# Patient Record
Sex: MALE | Race: WHITE | NOT HISPANIC OR LATINO | URBAN - METROPOLITAN AREA
[De-identification: names, ages, dates, MRNs, and addresses within clinical notes are randomized per-mention and may not be internally consistent; named-entity substitution may affect disease eponyms.]

---

## 2021-12-16 ENCOUNTER — OFFICE (OUTPATIENT)
Dept: URBAN - METROPOLITAN AREA CLINIC 79 | Facility: CLINIC | Age: 76
End: 2021-12-16

## 2021-12-16 VITALS
DIASTOLIC BLOOD PRESSURE: 89 MMHG | TEMPERATURE: 97.3 F | SYSTOLIC BLOOD PRESSURE: 138 MMHG | WEIGHT: 196 LBS | HEART RATE: 94 BPM | HEIGHT: 71 IN

## 2021-12-16 DIAGNOSIS — Z87.11 PERSONAL HISTORY OF PEPTIC ULCER DISEASE: ICD-10-CM

## 2021-12-16 DIAGNOSIS — R19.8 OTHER SPECIFIED SYMPTOMS AND SIGNS INVOLVING THE DIGESTIVE S: ICD-10-CM

## 2021-12-16 DIAGNOSIS — R49.0 DYSPHONIA: ICD-10-CM

## 2021-12-16 DIAGNOSIS — Z86.010 PERSONAL HISTORY OF COLONIC POLYPS: ICD-10-CM

## 2021-12-16 DIAGNOSIS — J30.2 OTHER SEASONAL ALLERGIC RHINITIS: ICD-10-CM

## 2021-12-16 DIAGNOSIS — R13.10 DYSPHAGIA, UNSPECIFIED: ICD-10-CM

## 2021-12-16 PROCEDURE — 99204 OFFICE O/P NEW MOD 45 MIN: CPT | Performed by: PHYSICIAN ASSISTANT

## 2021-12-16 NOTE — SERVICEHPINOTES
TRISH JEFFERSON   is a   75   year old white male who is being seen in consultation at the request of   SHANTEL FERRO   for "difficulty swallowing" that began about a year ago: No better or worse over time.  He notes sensation of solid foods and pills (capsules) "caught" in his upper to mid esophagus. At times will need to  "cough up" his pill to achieve relief when drinking water does not help pass it down. He also notes globus sensation that be believes is related to his seasonal allergies. He is on rx Protonix 40 mg used for 7-10 days at a time then off for several weeks due to concern about possible side effects. He notes mild to moderate reflux events mainly at night, worse when laying flat in bed due to feeling of "choking on reflux acid."  At times reflux will awaken him from sleep and will sit on his recliner chair until the reflux passes. He eats dinner by 6-7 pm. Last EGD in 2015 bx neg esophageal IM. Remote h/o PUD related to Naproxen use at that time. ETOH use include "red rickey" more since pandemic given 2-4 times a day. Denies chest pain, n/v, abdominal pain, change in BMs, melena, weight loss. br
br

## 2022-07-25 ENCOUNTER — AMBULATORY SURGICAL CENTER (OUTPATIENT)
Dept: URBAN - METROPOLITAN AREA SURGERY 23 | Facility: SURGERY | Age: 77
End: 2022-07-25
Payer: COMMERCIAL

## 2022-07-25 DIAGNOSIS — R13.10 DYSPHAGIA, UNSPECIFIED: ICD-10-CM

## 2022-07-25 DIAGNOSIS — K31.89 OTHER DISEASES OF STOMACH AND DUODENUM: ICD-10-CM

## 2022-07-25 PROCEDURE — 43239 EGD BIOPSY SINGLE/MULTIPLE: CPT | Performed by: INTERNAL MEDICINE
